# Patient Record
Sex: MALE | Race: WHITE | NOT HISPANIC OR LATINO | Employment: FULL TIME | URBAN - METROPOLITAN AREA
[De-identification: names, ages, dates, MRNs, and addresses within clinical notes are randomized per-mention and may not be internally consistent; named-entity substitution may affect disease eponyms.]

---

## 2019-01-12 ENCOUNTER — OFFICE VISIT (OUTPATIENT)
Dept: URGENT CARE | Facility: CLINIC | Age: 34
End: 2019-01-12
Payer: COMMERCIAL

## 2019-01-12 VITALS
HEART RATE: 99 BPM | RESPIRATION RATE: 18 BRPM | WEIGHT: 182 LBS | OXYGEN SATURATION: 96 % | SYSTOLIC BLOOD PRESSURE: 125 MMHG | HEIGHT: 72 IN | TEMPERATURE: 99 F | DIASTOLIC BLOOD PRESSURE: 79 MMHG | BODY MASS INDEX: 24.65 KG/M2

## 2019-01-12 DIAGNOSIS — T70.0XXA OTITIC BAROTRAUMA, INITIAL ENCOUNTER: Primary | ICD-10-CM

## 2019-01-12 PROCEDURE — 99203 PR OFFICE/OUTPT VISIT, NEW, LEVL III, 30-44 MIN: ICD-10-PCS | Mod: S$GLB,,, | Performed by: FAMILY MEDICINE

## 2019-01-12 PROCEDURE — 99203 OFFICE O/P NEW LOW 30 MIN: CPT | Mod: S$GLB,,, | Performed by: FAMILY MEDICINE

## 2019-01-12 RX ORDER — METHYLPHENIDATE HYDROCHLORIDE 5 MG/1
5 TABLET ORAL
COMMUNITY

## 2019-01-12 RX ORDER — ALBUTEROL SULFATE 90 UG/1
POWDER, METERED RESPIRATORY (INHALATION)
COMMUNITY
Start: 2018-12-28

## 2019-01-12 RX ORDER — CITALOPRAM 20 MG/1
TABLET, FILM COATED ORAL
COMMUNITY
Start: 2018-11-28

## 2019-01-12 NOTE — PROGRESS NOTES
Subjective:       Patient ID: Henry Owen is a 33 y.o. male.    Vitals:  height is 6' (1.829 m) and weight is 82.6 kg (182 lb). His oral temperature is 99.4 °F (37.4 °C). His blood pressure is 125/79 and his pulse is 99. His respiration is 18 and oxygen saturation is 96%.     Chief Complaint: Otalgia (left)    Patient states hes been having left ear pain for 3 hours. Patient states he was on an airplane on today. Had mild ear pain and congestion prior to flight.      Otalgia    There is pain in the left ear. This is a new problem. The current episode started today. The problem occurs constantly. The problem has been gradually worsening. There has been no fever. The pain is at a severity of 2/10. The pain is mild. Associated symptoms include hearing loss. Pertinent negatives include no coughing, diarrhea, headaches, rash, sore throat or vomiting. He has tried nothing for the symptoms. The treatment provided no relief. There is no history of a chronic ear infection, hearing loss or a tympanostomy tube.       Constitution: Negative for chills, fatigue and fever.   HENT: Positive for ear pain, tinnitus, hearing loss and congestion. Negative for sore throat.    Neck: Negative for painful lymph nodes.   Cardiovascular: Negative for chest pain and leg swelling.   Eyes: Negative for double vision and blurred vision.   Respiratory: Negative for cough and shortness of breath.    Gastrointestinal: Negative for nausea, vomiting and diarrhea.   Genitourinary: Negative for dysuria, frequency and urgency.   Musculoskeletal: Negative for joint pain, joint swelling, muscle cramps and muscle ache.   Skin: Negative for color change, pale and rash.   Allergic/Immunologic: Negative for seasonal allergies.   Neurological: Negative for dizziness, history of vertigo, light-headedness, passing out and headaches.   Hematologic/Lymphatic: Negative for swollen lymph nodes, easy bruising/bleeding and history of blood clots. Does not  bruise/bleed easily.   Psychiatric/Behavioral: Negative for nervous/anxious, sleep disturbance and depression. The patient is not nervous/anxious.        Objective:      Physical Exam   Constitutional: He is oriented to person, place, and time. He appears well-developed and well-nourished. He is cooperative.  Non-toxic appearance. He does not appear ill. No distress.   HENT:   Head: Normocephalic and atraumatic.   Right Ear: Hearing, external ear and ear canal normal. Tympanic membrane is not perforated, not erythematous and not bulging. No middle ear effusion.   Left Ear: Hearing, external ear and ear canal normal. Tympanic membrane is erythematous. Tympanic membrane is not perforated and not bulging. A middle ear effusion (clear not purulent) is present.   Nose: Mucosal edema present. No rhinorrhea or nasal deformity. No epistaxis. Right sinus exhibits no maxillary sinus tenderness and no frontal sinus tenderness. Left sinus exhibits no maxillary sinus tenderness and no frontal sinus tenderness.   Mouth/Throat: Uvula is midline, oropharynx is clear and moist and mucous membranes are normal. No trismus in the jaw. Normal dentition. No uvula swelling. No posterior oropharyngeal erythema.   Eyes: Conjunctivae and lids are normal. No scleral icterus.   Sclera clear bilat   Neck: Trachea normal, full passive range of motion without pain and phonation normal. Neck supple.   Cardiovascular: Normal rate, regular rhythm, normal heart sounds, intact distal pulses and normal pulses.   Pulmonary/Chest: Effort normal and breath sounds normal. No respiratory distress.   Abdominal: Soft. Normal appearance and bowel sounds are normal. He exhibits no distension. There is no tenderness.   Musculoskeletal: Normal range of motion. He exhibits no edema or deformity.   Neurological: He is alert and oriented to person, place, and time. He exhibits normal muscle tone. Coordination normal.   Skin: Skin is warm, dry and intact. He is not  diaphoretic. No pallor.   Psychiatric: He has a normal mood and affect. His speech is normal and behavior is normal. Judgment and thought content normal. Cognition and memory are normal.   Nursing note and vitals reviewed.      Assessment:       1. Otitic barotrauma, initial encounter        Plan:         Otitic barotrauma, initial encounter          Patient Instructions   PLEASE READ YOUR DISCHARGE INSTRUCTIONS ENTIRELY AS IT CONTAINS IMPORTANT INFORMATION.    Continue the afrin no more than 3 days at a time    Try sudafed    Continue popping ears, drink fluids, chew gum    Tylenol and ibuprofen    Humidifier    Please return or see your primary care doctor if you develop new or worsening symptoms.     You must understand that you have received an Urgent Care treatment only and that you may be released before all of your medical problems are known or treated.    Ear Barotrauma    Ear barotrauma is a problem of the middle ear and eardrum. It occurs when unequal pressures form inside the middle ear and outside the eardrum. This often happens with altitude changes such as during an airplane flight or scuba diving. It can also occur after a blow to the head. If you already have congestion from a cold or allergies, barotrauma is more likely to occur.  Symptoms include pressure and pain in the ears. You may feel dizzy. Your ears may feel plugged. You may also have short-term hearing loss. In severe cases, the eardrum may rupture. This can lead to bleeding and infection.  Often, self-care is all that is needed to relieve symptoms. Even a ruptured eardrum will most likely heal on its own. If it does not, surgery may be needed to repair it. In this case, youll be referred to a specialist for further care.  Home care  You may be given medication to help relieve ear pain. You may also be given medications to help relieve congestion. These can include oral or nasal decongestants and antihistamines. To help prevent or treat an  ear infection, you may be prescribed antibiotics. Be sure to take all medications as directed. Also be sure to complete the medications.  Prevention  · Each ear has a eustachian tube, which connects the middle ear to the back of the throat. Certain self-care steps can help keep the eustachian tubes open and relieve pressure in the middle ear. These steps include:  ¨ Yawning  ¨ Swallowing often  ¨ Sucking on candy  ¨ Chewing gum  ¨ Performing the Valsalva maneuver. To do this, take a deep breath and hold it. Pinch your nostrils closed. Then blow gently against your closed nostrils until you feel a popping sensation in your ears.  · Nose, sinus, or ear congestion makes barotrauma more likely. Use caution if you have a cold, infection, or allergies. If you have any of these and need to fly, take an antihistamine and decongestant one to two hours before flying.  · When flying, do not sleep during the descent. Use the self-care steps listed above to help open the eustachian tubes.  · When scuba diving, descend and ascend slowly. Diving when you have any sinus, ear, or nasal congestion is not recommended.  Follow-up care  Follow up with your health care provider, or as advised.  When to seek medical advice  Call your health care provider right away if any of these occur:  · Fever of 100.4°F (38°C) or higher, or as directed by your health care provider  · Increasing pain or ringing in your ears  · Hearing loss that lasts longer than two days  · Fluid or blood draining from the ear  · Dizziness  Date Last Reviewed: 3/22/2015  © 5777-3700 [employer]. [address] [place] [address], [place] [address]. All rights reserved. This information is not intended as a substitute for professional medical care. Always follow your healthcare professional's instructions.        LEFT EAR        RIGHT EAR      FROM UPTOD[employer]

## 2019-01-12 NOTE — PATIENT INSTRUCTIONS
PLEASE READ YOUR DISCHARGE INSTRUCTIONS ENTIRELY AS IT CONTAINS IMPORTANT INFORMATION.    Continue the afrin no more than 3 days at a time    Try sudafed    Continue popping ears, drink fluids, chew gum    Tylenol and ibuprofen    Humidifier    Please return or see your primary care doctor if you develop new or worsening symptoms.     You must understand that you have received an Urgent Care treatment only and that you may be released before all of your medical problems are known or treated.    Ear Barotrauma    Ear barotrauma is a problem of the middle ear and eardrum. It occurs when unequal pressures form inside the middle ear and outside the eardrum. This often happens with altitude changes such as during an airplane flight or scuba diving. It can also occur after a blow to the head. If you already have congestion from a cold or allergies, barotrauma is more likely to occur.  Symptoms include pressure and pain in the ears. You may feel dizzy. Your ears may feel plugged. You may also have short-term hearing loss. In severe cases, the eardrum may rupture. This can lead to bleeding and infection.  Often, self-care is all that is needed to relieve symptoms. Even a ruptured eardrum will most likely heal on its own. If it does not, surgery may be needed to repair it. In this case, youll be referred to a specialist for further care.  Home care  You may be given medication to help relieve ear pain. You may also be given medications to help relieve congestion. These can include oral or nasal decongestants and antihistamines. To help prevent or treat an ear infection, you may be prescribed antibiotics. Be sure to take all medications as directed. Also be sure to complete the medications.  Prevention  · Each ear has a eustachian tube, which connects the middle ear to the back of the throat. Certain self-care steps can help keep the eustachian tubes open and relieve pressure in the middle ear. These steps  include:  ¨ Yawning  ¨ Swallowing often  ¨ Sucking on candy  ¨ Chewing gum  ¨ Performing the Valsalva maneuver. To do this, take a deep breath and hold it. Pinch your nostrils closed. Then blow gently against your closed nostrils until you feel a popping sensation in your ears.  · Nose, sinus, or ear congestion makes barotrauma more likely. Use caution if you have a cold, infection, or allergies. If you have any of these and need to fly, take an antihistamine and decongestant one to two hours before flying.  · When flying, do not sleep during the descent. Use the self-care steps listed above to help open the eustachian tubes.  · When scuba diving, descend and ascend slowly. Diving when you have any sinus, ear, or nasal congestion is not recommended.  Follow-up care  Follow up with your health care provider, or as advised.  When to seek medical advice  Call your health care provider right away if any of these occur:  · Fever of 100.4°F (38°C) or higher, or as directed by your health care provider  · Increasing pain or ringing in your ears  · Hearing loss that lasts longer than two days  · Fluid or blood draining from the ear  · Dizziness  Date Last Reviewed: 3/22/2015  © 9673-3761 [employer]. [address] [place] [address], [place] [address]. All rights reserved. This information is not intended as a substitute for professional medical care. Always follow your healthcare professional's instructions.        LEFT EAR        RIGHT EAR      FROM UPDATE